# Patient Record
Sex: FEMALE | URBAN - METROPOLITAN AREA
[De-identification: names, ages, dates, MRNs, and addresses within clinical notes are randomized per-mention and may not be internally consistent; named-entity substitution may affect disease eponyms.]

---

## 2021-11-03 ENCOUNTER — OFFICE VISIT (OUTPATIENT)
Dept: URBAN - METROPOLITAN AREA CLINIC 10 | Facility: CLINIC | Age: 46
End: 2021-11-03
Payer: COMMERCIAL

## 2021-11-03 DIAGNOSIS — H16.403 UNSPECIFIED CORNEAL NEOVASCULARIZATION, BILATERAL: ICD-10-CM

## 2021-11-03 PROCEDURE — 92025 CPTRIZED CORNEAL TOPOGRAPHY: CPT | Performed by: OPHTHALMOLOGY

## 2021-11-03 PROCEDURE — 99204 OFFICE O/P NEW MOD 45 MIN: CPT | Performed by: OPHTHALMOLOGY

## 2021-11-03 PROCEDURE — 76514 ECHO EXAM OF EYE THICKNESS: CPT | Performed by: OPHTHALMOLOGY

## 2021-11-03 RX ORDER — CYCLOSPORINE 0 G/ML
0.09 % SOLUTION/ DROPS OPHTHALMIC; TOPICAL
Qty: 60 | Refills: 1 | Status: ACTIVE
Start: 2021-11-03

## 2021-11-03 RX ORDER — PREDNISOLONE ACETATE 10 MG/ML
1 % SUSPENSION/ DROPS OPHTHALMIC
Qty: 5 | Refills: 5 | Status: ACTIVE
Start: 2021-11-03

## 2021-11-03 RX ORDER — DOXYCYCLINE HYCLATE 100 MG/1
100 MG TABLET, COATED ORAL
Qty: 30 | Refills: 3 | Status: INACTIVE
Start: 2021-11-03 | End: 2022-04-07

## 2021-11-03 ASSESSMENT — VISUAL ACUITY
OD: 20/25
OS: 20/25

## 2021-11-03 ASSESSMENT — INTRAOCULAR PRESSURE
OD: 10
OS: 9

## 2021-11-03 NOTE — IMPRESSION/PLAN
Impression: Unspecified corneal neovascularization, bilateral: H16.403. Plan: Etiology unknown, denies h/o CTLs. H.o eye infection OU in 2nd grade. Denies h/o HSVK. Will optimize ocular surface:
Start Pred OU BID. Start Cequa/Restasis/Xiidra OU BID. Start PAFATs 3-4x/d. 
Star Doxy 100mg qd

## 2021-11-03 NOTE — IMPRESSION/PLAN
Impression: Keratoconus, unstable, bilateral: A64.137. Plan: Explained to the patient the progressive nature of the disease and can significantly reduce a patient's visual acuity and visual quality. First line treatments include hard contact lenses, scleral contact lenses and intracorneal ring segments. These treatments address signs and symptoms but do not halt progression of the disease. If the disease progresses, it can lead to corneal blindness and may require corneal transplantation for visual recovery which is invasive, has a prolonged recovery period, and carries a lifelong risk of tissue rejection, graft failure, post-surgical medications and other complications. Corneal crosslinking (CXL) is the only FDA-approved treatment modality that has been shown to slow or halt the progression of keratoconus. This may prevent the need for future corneal transplantation. Early treatment of keratoconus with CXL is medically necessary and indicated. Will obtain records from Dr. Rangel Dunn from the last 3 years. Will treat ocular surface first and repeat Pentacam in a couple of months.

## 2022-01-17 ENCOUNTER — OFFICE VISIT (OUTPATIENT)
Dept: URBAN - METROPOLITAN AREA CLINIC 10 | Facility: CLINIC | Age: 47
End: 2022-01-17
Payer: COMMERCIAL

## 2022-01-17 DIAGNOSIS — H17.13 CENTRAL CORNEAL OPACITY, BILATERAL: ICD-10-CM

## 2022-01-17 DIAGNOSIS — H18.623 KERATOCONUS, UNSTABLE, BILATERAL: Primary | ICD-10-CM

## 2022-01-17 PROCEDURE — 92025 CPTRIZED CORNEAL TOPOGRAPHY: CPT | Performed by: OPHTHALMOLOGY

## 2022-01-17 PROCEDURE — 99213 OFFICE O/P EST LOW 20 MIN: CPT | Performed by: OPHTHALMOLOGY

## 2022-01-17 PROCEDURE — 92286 ANT SGM IMG I&R SPECLR MIC: CPT | Performed by: OPHTHALMOLOGY

## 2022-01-17 ASSESSMENT — INTRAOCULAR PRESSURE
OD: 14
OS: 11

## 2022-01-17 NOTE — IMPRESSION/PLAN
Impression: Unspecified corneal neovascularization, bilateral: H16.403. Plan: Surface improved. Decrease Pred OU to qD. Cont Cequa/Restasis/Xiidra OU BID. Start PAFATs 3-4x/d. 
Star Doxy 100mg qd

## 2022-01-17 NOTE — IMPRESSION/PLAN
Impression: Keratoconus, unstable, bilateral: H32.308. Plan: Explained to the patient the progressive nature of the disease and can significantly reduce a patient's visual acuity and visual quality. First line treatments include hard contact lenses, scleral contact lenses and intracorneal ring segments. These treatments address signs and symptoms but do not halt progression of the disease. If the disease progresses, it can lead to corneal blindness and may require corneal transplantation for visual recovery which is invasive, has a prolonged recovery period, and carries a lifelong risk of tissue rejection, graft failure, post-surgical medications and other complications. Corneal crosslinking (CXL) is the only FDA-approved treatment modality that has been shown to slow or halt the progression of keratoconus. This may prevent the need for future corneal transplantation. Early treatment of keratoconus with CXL is medically necessary and indicated. Discussed concerns of further scarring given current scarring.  Modesta Chiu repeat pentacam in 2 months then crosslink.

## 2022-04-18 ENCOUNTER — OFFICE VISIT (OUTPATIENT)
Dept: URBAN - METROPOLITAN AREA CLINIC 44 | Facility: CLINIC | Age: 47
End: 2022-04-18
Payer: COMMERCIAL

## 2022-04-18 DIAGNOSIS — H16.403 UNSPECIFIED CORNEAL NEOVASCULARIZATION, BILATERAL: ICD-10-CM

## 2022-04-18 DIAGNOSIS — H18.623 KERATOCONUS, UNSTABLE, BILATERAL: Primary | ICD-10-CM

## 2022-04-18 PROCEDURE — 99213 OFFICE O/P EST LOW 20 MIN: CPT | Performed by: OPHTHALMOLOGY

## 2022-04-18 PROCEDURE — 92025 CPTRIZED CORNEAL TOPOGRAPHY: CPT | Performed by: OPHTHALMOLOGY

## 2022-04-18 ASSESSMENT — VISUAL ACUITY
OS: 20/25
OD: 20/20

## 2022-04-18 ASSESSMENT — INTRAOCULAR PRESSURE
OD: 17
OS: 14

## 2022-04-18 NOTE — IMPRESSION/PLAN
Impression: Unspecified corneal neovascularization, bilateral: H16.403. Plan: Improved and stable. D/c Pred OU Cont Cequa OU BID. Cont PAFATs 3-4x/d. 
Cont Doxy 100mg qd

## 2022-04-18 NOTE — IMPRESSION/PLAN
Impression: Keratoconus, unstable, bilateral: R02.507. Plan: Stable compared to last visit. Will CTM for now. 
RTC in x for pentacam, p[achy, refract OU